# Patient Record
Sex: FEMALE | Race: WHITE | NOT HISPANIC OR LATINO | Employment: UNEMPLOYED | URBAN - METROPOLITAN AREA
[De-identification: names, ages, dates, MRNs, and addresses within clinical notes are randomized per-mention and may not be internally consistent; named-entity substitution may affect disease eponyms.]

---

## 2017-03-06 ENCOUNTER — RECORDS - HEALTHEAST (OUTPATIENT)
Dept: GENERAL RADIOLOGY | Facility: CLINIC | Age: 42
End: 2017-03-06

## 2017-03-06 ENCOUNTER — OFFICE VISIT - HEALTHEAST (OUTPATIENT)
Dept: PODIATRY | Facility: CLINIC | Age: 42
End: 2017-03-06

## 2017-03-06 ENCOUNTER — RECORDS - HEALTHEAST (OUTPATIENT)
Dept: ADMINISTRATIVE | Facility: OTHER | Age: 42
End: 2017-03-06

## 2017-03-06 DIAGNOSIS — M20.10 HALLUX VALGUS (ACQUIRED), UNSPECIFIED FOOT: ICD-10-CM

## 2017-03-06 DIAGNOSIS — M20.10 HALLUX VALGUS, UNSPECIFIED LATERALITY: ICD-10-CM

## 2017-03-20 ENCOUNTER — COMMUNICATION - HEALTHEAST (OUTPATIENT)
Dept: TELEHEALTH | Facility: CLINIC | Age: 42
End: 2017-03-20

## 2017-03-20 ENCOUNTER — OFFICE VISIT - HEALTHEAST (OUTPATIENT)
Dept: FAMILY MEDICINE | Facility: CLINIC | Age: 42
End: 2017-03-20

## 2017-03-20 DIAGNOSIS — Z72.0 TOBACCO USE: ICD-10-CM

## 2017-03-20 DIAGNOSIS — Z01.818 PRE-OP EVALUATION: ICD-10-CM

## 2017-03-20 DIAGNOSIS — M21.612 BUNION, LEFT: ICD-10-CM

## 2017-03-20 ASSESSMENT — MIFFLIN-ST. JEOR: SCORE: 1180.45

## 2017-03-23 ENCOUNTER — ANESTHESIA - HEALTHEAST (OUTPATIENT)
Dept: SURGERY | Facility: CLINIC | Age: 42
End: 2017-03-23

## 2017-03-23 ENCOUNTER — COMMUNICATION - HEALTHEAST (OUTPATIENT)
Dept: HEALTH INFORMATION MANAGEMENT | Facility: CLINIC | Age: 42
End: 2017-03-23

## 2017-03-24 ENCOUNTER — SURGERY - HEALTHEAST (OUTPATIENT)
Dept: SURGERY | Facility: CLINIC | Age: 42
End: 2017-03-24

## 2017-03-24 ASSESSMENT — MIFFLIN-ST. JEOR: SCORE: 1154.37

## 2017-03-25 ENCOUNTER — COMMUNICATION - HEALTHEAST (OUTPATIENT)
Dept: SCHEDULING | Facility: CLINIC | Age: 42
End: 2017-03-25

## 2017-03-28 ENCOUNTER — COMMUNICATION - HEALTHEAST (OUTPATIENT)
Dept: HEALTH INFORMATION MANAGEMENT | Facility: CLINIC | Age: 42
End: 2017-03-28

## 2017-03-30 ENCOUNTER — OFFICE VISIT - HEALTHEAST (OUTPATIENT)
Dept: PODIATRY | Facility: CLINIC | Age: 42
End: 2017-03-30

## 2017-03-30 DIAGNOSIS — M20.10 HALLUX VALGUS, UNSPECIFIED LATERALITY: ICD-10-CM

## 2017-04-06 ENCOUNTER — RECORDS - HEALTHEAST (OUTPATIENT)
Dept: GENERAL RADIOLOGY | Facility: CLINIC | Age: 42
End: 2017-04-06

## 2017-04-06 ENCOUNTER — OFFICE VISIT - HEALTHEAST (OUTPATIENT)
Dept: PODIATRY | Facility: CLINIC | Age: 42
End: 2017-04-06

## 2017-04-06 DIAGNOSIS — M20.10 HALLUX VALGUS, UNSPECIFIED LATERALITY: ICD-10-CM

## 2017-04-06 DIAGNOSIS — M20.10 HALLUX VALGUS (ACQUIRED), UNSPECIFIED FOOT: ICD-10-CM

## 2017-04-20 ENCOUNTER — OFFICE VISIT - HEALTHEAST (OUTPATIENT)
Dept: PODIATRY | Facility: CLINIC | Age: 42
End: 2017-04-20

## 2017-04-20 DIAGNOSIS — M20.10 HALLUX VALGUS, UNSPECIFIED LATERALITY: ICD-10-CM

## 2017-05-03 ENCOUNTER — COMMUNICATION - HEALTHEAST (OUTPATIENT)
Dept: ADMINISTRATIVE | Facility: CLINIC | Age: 42
End: 2017-05-03

## 2017-05-16 ENCOUNTER — COMMUNICATION - HEALTHEAST (OUTPATIENT)
Dept: SCHEDULING | Facility: CLINIC | Age: 42
End: 2017-05-16

## 2017-05-16 ENCOUNTER — COMMUNICATION - HEALTHEAST (OUTPATIENT)
Dept: ADMINISTRATIVE | Facility: CLINIC | Age: 42
End: 2017-05-16

## 2017-05-23 ENCOUNTER — OFFICE VISIT - HEALTHEAST (OUTPATIENT)
Dept: PODIATRY | Age: 42
End: 2017-05-23

## 2017-05-23 DIAGNOSIS — M79.672 LEFT FOOT PAIN: ICD-10-CM

## 2017-08-14 ENCOUNTER — RECORDS - HEALTHEAST (OUTPATIENT)
Dept: ADMINISTRATIVE | Facility: OTHER | Age: 42
End: 2017-08-14

## 2017-09-25 ENCOUNTER — COMMUNICATION - HEALTHEAST (OUTPATIENT)
Dept: HEALTH INFORMATION MANAGEMENT | Facility: CLINIC | Age: 42
End: 2017-09-25

## 2018-04-05 ENCOUNTER — RECORDS - HEALTHEAST (OUTPATIENT)
Dept: ADMINISTRATIVE | Facility: OTHER | Age: 43
End: 2018-04-05

## 2018-04-06 ENCOUNTER — RECORDS - HEALTHEAST (OUTPATIENT)
Dept: ADMINISTRATIVE | Facility: OTHER | Age: 43
End: 2018-04-06

## 2018-04-07 ENCOUNTER — RECORDS - HEALTHEAST (OUTPATIENT)
Dept: ADMINISTRATIVE | Facility: OTHER | Age: 43
End: 2018-04-07

## 2018-06-06 ENCOUNTER — OFFICE VISIT - HEALTHEAST (OUTPATIENT)
Dept: FAMILY MEDICINE | Facility: CLINIC | Age: 43
End: 2018-06-06

## 2018-06-06 DIAGNOSIS — S69.92XA HAND INJURY, LEFT, INITIAL ENCOUNTER: ICD-10-CM

## 2018-06-06 ASSESSMENT — MIFFLIN-ST. JEOR: SCORE: 1149.55

## 2018-08-16 ENCOUNTER — COMMUNICATION - HEALTHEAST (OUTPATIENT)
Dept: TELEHEALTH | Facility: CLINIC | Age: 43
End: 2018-08-16

## 2018-08-21 ENCOUNTER — COMMUNICATION - HEALTHEAST (OUTPATIENT)
Dept: HEALTH INFORMATION MANAGEMENT | Facility: CLINIC | Age: 43
End: 2018-08-21

## 2019-08-26 ENCOUNTER — OFFICE VISIT - HEALTHEAST (OUTPATIENT)
Dept: PODIATRY | Facility: CLINIC | Age: 44
End: 2019-08-26

## 2019-08-26 DIAGNOSIS — S92.401A CLOSED DISPLACED FRACTURE OF PHALANX OF RIGHT GREAT TOE, UNSPECIFIED PHALANX, INITIAL ENCOUNTER: ICD-10-CM

## 2019-08-26 ASSESSMENT — MIFFLIN-ST. JEOR: SCORE: 1147.56

## 2021-01-09 ENCOUNTER — RECORDS - HEALTHEAST (OUTPATIENT)
Dept: ADMINISTRATIVE | Facility: OTHER | Age: 46
End: 2021-01-09

## 2021-01-18 ENCOUNTER — OFFICE VISIT - HEALTHEAST (OUTPATIENT)
Dept: FAMILY MEDICINE | Facility: CLINIC | Age: 46
End: 2021-01-18

## 2021-01-18 DIAGNOSIS — M79.645 PAIN OF FINGER OF LEFT HAND: ICD-10-CM

## 2021-01-18 DIAGNOSIS — Z23 NEED FOR IMMUNIZATION AGAINST INFLUENZA: ICD-10-CM

## 2021-01-18 DIAGNOSIS — Z82.0 FAMILY HISTORY OF EPILEPSY IN FATHER: ICD-10-CM

## 2021-01-18 DIAGNOSIS — Z09 HOSPITAL DISCHARGE FOLLOW-UP: ICD-10-CM

## 2021-01-18 DIAGNOSIS — Z23 NEED FOR VACCINATION: ICD-10-CM

## 2021-01-18 DIAGNOSIS — R47.81 SLURRED SPEECH: ICD-10-CM

## 2021-01-18 ASSESSMENT — MIFFLIN-ST. JEOR: SCORE: 1132.18

## 2021-02-04 ENCOUNTER — COMMUNICATION - HEALTHEAST (OUTPATIENT)
Dept: FAMILY MEDICINE | Facility: CLINIC | Age: 46
End: 2021-02-04

## 2021-02-17 ASSESSMENT — PATIENT HEALTH QUESTIONNAIRE - PHQ9: SUM OF ALL RESPONSES TO PHQ QUESTIONS 1-9: 12

## 2021-02-18 ENCOUNTER — OFFICE VISIT - HEALTHEAST (OUTPATIENT)
Dept: FAMILY MEDICINE | Facility: CLINIC | Age: 46
End: 2021-02-18

## 2021-02-18 DIAGNOSIS — Z80.3 FAMILY HISTORY OF MALIGNANT NEOPLASM OF BREAST: ICD-10-CM

## 2021-02-18 DIAGNOSIS — N91.2 AMENORRHEA: ICD-10-CM

## 2021-02-18 DIAGNOSIS — K64.4 EXTERNAL HEMORRHOIDS: ICD-10-CM

## 2021-02-18 DIAGNOSIS — N89.8 VAGINAL DISCHARGE: ICD-10-CM

## 2021-02-18 DIAGNOSIS — A59.01 TRICHOMONAL VAGINITIS: ICD-10-CM

## 2021-02-18 DIAGNOSIS — Z00.00 ANNUAL PHYSICAL EXAM: ICD-10-CM

## 2021-02-18 DIAGNOSIS — B37.31 CANDIDIASIS OF VAGINA: ICD-10-CM

## 2021-02-18 DIAGNOSIS — Z84.89 FAMILY HISTORY OF SEIZURES: ICD-10-CM

## 2021-02-18 DIAGNOSIS — Z86.2 HISTORY OF ANEMIA: ICD-10-CM

## 2021-02-18 DIAGNOSIS — Z01.419 ENCOUNTER FOR ROUTINE GYNECOLOGICAL EXAMINATION: ICD-10-CM

## 2021-02-18 DIAGNOSIS — K25.4 GASTROINTESTINAL HEMORRHAGE ASSOCIATED WITH GASTRIC ULCER: ICD-10-CM

## 2021-02-18 DIAGNOSIS — Z12.31 ENCOUNTER FOR SCREENING MAMMOGRAM FOR BREAST CANCER: ICD-10-CM

## 2021-02-18 LAB
ALBUMIN SERPL-MCNC: 3.9 G/DL (ref 3.5–5)
ALP SERPL-CCNC: 59 U/L (ref 45–120)
ALT SERPL W P-5'-P-CCNC: 13 U/L (ref 0–45)
ANION GAP SERPL CALCULATED.3IONS-SCNC: 7 MMOL/L (ref 5–18)
AST SERPL W P-5'-P-CCNC: 15 U/L (ref 0–40)
BASOPHILS # BLD AUTO: 0 THOU/UL (ref 0–0.2)
BASOPHILS NFR BLD AUTO: 1 % (ref 0–2)
BILIRUB SERPL-MCNC: 0.3 MG/DL (ref 0–1)
BUN SERPL-MCNC: 25 MG/DL (ref 8–22)
CALCIUM SERPL-MCNC: 9.2 MG/DL (ref 8.5–10.5)
CHLORIDE BLD-SCNC: 104 MMOL/L (ref 98–107)
CLUE CELLS: ABNORMAL
CO2 SERPL-SCNC: 28 MMOL/L (ref 22–31)
CREAT SERPL-MCNC: 0.75 MG/DL (ref 0.6–1.1)
EOSINOPHIL # BLD AUTO: 0.2 THOU/UL (ref 0–0.4)
EOSINOPHIL NFR BLD AUTO: 2 % (ref 0–6)
ERYTHROCYTE [DISTWIDTH] IN BLOOD BY AUTOMATED COUNT: 13.2 % (ref 11–14.5)
GFR SERPL CREATININE-BSD FRML MDRD: >60 ML/MIN/1.73M2
GLUCOSE BLD-MCNC: 92 MG/DL (ref 70–125)
HCT VFR BLD AUTO: 39.3 % (ref 35–47)
HGB BLD-MCNC: 12.9 G/DL (ref 12–16)
IMM GRANULOCYTES # BLD: 0 THOU/UL
IMM GRANULOCYTES NFR BLD: 0 %
LDLC SERPL CALC-MCNC: 112 MG/DL
LYMPHOCYTES # BLD AUTO: 2.6 THOU/UL (ref 0.8–4.4)
LYMPHOCYTES NFR BLD AUTO: 33 % (ref 20–40)
MCH RBC QN AUTO: 30.7 PG (ref 27–34)
MCHC RBC AUTO-ENTMCNC: 32.8 G/DL (ref 32–36)
MCV RBC AUTO: 94 FL (ref 80–100)
MONOCYTES # BLD AUTO: 0.7 THOU/UL (ref 0–0.9)
MONOCYTES NFR BLD AUTO: 9 % (ref 2–10)
NEUTROPHILS # BLD AUTO: 4.2 THOU/UL (ref 2–7.7)
NEUTROPHILS NFR BLD AUTO: 55 % (ref 50–70)
PLATELET # BLD AUTO: 291 THOU/UL (ref 140–440)
PMV BLD AUTO: 10.1 FL (ref 7–10)
POTASSIUM BLD-SCNC: 4.1 MMOL/L (ref 3.5–5)
PROT SERPL-MCNC: 7 G/DL (ref 6–8)
RBC # BLD AUTO: 4.2 MILL/UL (ref 3.8–5.4)
SODIUM SERPL-SCNC: 139 MMOL/L (ref 136–145)
TRICHOMONAS, WET PREP: ABNORMAL
TSH SERPL DL<=0.005 MIU/L-ACNC: 1.56 UIU/ML (ref 0.3–5)
WBC: 7.7 THOU/UL (ref 4–11)
YEAST, WET PREP: ABNORMAL

## 2021-02-18 ASSESSMENT — MIFFLIN-ST. JEOR: SCORE: 1138.97

## 2021-02-19 ENCOUNTER — COMMUNICATION - HEALTHEAST (OUTPATIENT)
Dept: FAMILY MEDICINE | Facility: CLINIC | Age: 46
End: 2021-02-19

## 2021-02-19 LAB
C TRACH DNA SPEC QL PROBE+SIG AMP: NEGATIVE
HPV SOURCE: ABNORMAL
HUMAN PAPILLOMA VIRUS 16 DNA: NEGATIVE
HUMAN PAPILLOMA VIRUS 18 DNA: NEGATIVE
HUMAN PAPILLOMA VIRUS FINAL DIAGNOSIS: ABNORMAL
HUMAN PAPILLOMA VIRUS OTHER HR: POSITIVE
N GONORRHOEA DNA SPEC QL NAA+PROBE: NEGATIVE
SPECIMEN DESCRIPTION: ABNORMAL

## 2021-02-23 ENCOUNTER — COMMUNICATION - HEALTHEAST (OUTPATIENT)
Dept: FAMILY MEDICINE | Facility: CLINIC | Age: 46
End: 2021-02-23

## 2021-02-25 ENCOUNTER — COMMUNICATION - HEALTHEAST (OUTPATIENT)
Dept: OBGYN | Facility: CLINIC | Age: 46
End: 2021-02-25

## 2021-02-25 DIAGNOSIS — R87.611 ATYPICAL SQUAMOUS CELLS CANNOT EXCLUDE HIGH GRADE SQUAMOUS INTRAEPITHELIAL LESION ON CYTOLOGIC SMEAR OF CERVIX (ASC-H): ICD-10-CM

## 2021-02-25 LAB
BKR LAB AP ABNORMAL BLEEDING: YES
BKR LAB AP BIRTH CONTROL/HORMONES: ABNORMAL
BKR LAB AP CERVICAL APPEARANCE: NORMAL
BKR LAB AP GYN ADEQUACY: ABNORMAL
BKR LAB AP GYN INTERPRETATION: ABNORMAL
BKR LAB AP HPV REFLEX: ABNORMAL
BKR LAB AP LMP: ABNORMAL
BKR LAB AP PATIENT STATUS: ABNORMAL
BKR LAB AP PREVIOUS ABNORMAL: ABNORMAL
BKR LAB AP PREVIOUS NORMAL: ABNORMAL
HIGH RISK?: NO
PATH REPORT.COMMENTS IMP SPEC: ABNORMAL
RESULT FLAG (HE HISTORICAL CONVERSION): ABNORMAL

## 2021-04-19 ENCOUNTER — COMMUNICATION - HEALTHEAST (OUTPATIENT)
Dept: OBGYN | Facility: CLINIC | Age: 46
End: 2021-04-19

## 2021-04-19 ENCOUNTER — COMMUNICATION - HEALTHEAST (OUTPATIENT)
Dept: FAMILY MEDICINE | Facility: CLINIC | Age: 46
End: 2021-04-19

## 2021-05-18 ENCOUNTER — COMMUNICATION - HEALTHEAST (OUTPATIENT)
Dept: OBGYN | Facility: CLINIC | Age: 46
End: 2021-05-18

## 2021-05-24 ENCOUNTER — RECORDS - HEALTHEAST (OUTPATIENT)
Dept: ADMINISTRATIVE | Facility: CLINIC | Age: 46
End: 2021-05-24

## 2021-05-27 ASSESSMENT — PATIENT HEALTH QUESTIONNAIRE - PHQ9: SUM OF ALL RESPONSES TO PHQ QUESTIONS 1-9: 12

## 2021-05-29 ENCOUNTER — RECORDS - HEALTHEAST (OUTPATIENT)
Dept: ADMINISTRATIVE | Facility: CLINIC | Age: 46
End: 2021-05-29

## 2021-05-30 ENCOUNTER — RECORDS - HEALTHEAST (OUTPATIENT)
Dept: ADMINISTRATIVE | Facility: CLINIC | Age: 46
End: 2021-05-30

## 2021-05-30 VITALS — BODY MASS INDEX: 19.55 KG/M2 | WEIGHT: 114.5 LBS | HEIGHT: 64 IN

## 2021-05-30 VITALS — BODY MASS INDEX: 19.74 KG/M2 | WEIGHT: 118.5 LBS | HEIGHT: 65 IN

## 2021-05-31 NOTE — PROGRESS NOTES
FOOT AND ANKLE SURGERY/PODIATRY Progress Note        ASSESSMENT:   Nondisplaced fracture right great toe    HPI: Arely Nuñez was seen again today complaining of painful right great toe.  The patient stated that she injured her right great toe 3 4 days ago.  She dropped a large salt block on her right great toe.  She stated that at the time of the injury she has severe pain, discoloration of the right great toe.  She had an x-ray taken which showed a nondisplaced comminuted fracture of the distal shaft of the distal phalanx of the right great toe.  The patient is currently ambulating in a cam boot.    Past Medical History:   Diagnosis Date     Anxiety      Bacterial vaginosis      Constipation      Depression      Depression     Created by Conversion      Hearing loss in right ear        Past Surgical History:   Procedure Laterality Date     MASTOID DEBRIDEMENT Right 1990     KS CORRJ HALLUX VALGUS W/SESMDC W/1METAR MEDIAL CNF Left 3/24/2017    Procedure: LAPIDUS BUNIONECTOMY LEFT FOOT;  Surgeon: Jah Welch DPM;  Location: [facility];  Service: Podiatry     KS LIGATE FALLOPIAN TUBE,POSTPARTUM      Description: Tubal Ligation After Vaginal Delivery (Same Hospitalization);  Recorded: 03/05/2011;  Comments: 1/8/11     SKIN GRAFT SPLIT THICKNESS LEG / FOOT  2014       Allergies   Allergen Reactions     Bacitracin Hives         Current Outpatient Medications:      HYDROcodone-acetaminophen 5-325 mg per tablet, , Disp: , Rfl: 0    Family History   Problem Relation Age of Onset     COPD Mother      Stroke Father      Heart disease Father        Social History     Socioeconomic History     Marital status: Single     Spouse name: Not on file     Number of children: Not on file     Years of education: Not on file     Highest education level: Not on file   Occupational History     Not on file   Social Needs     Financial resource strain: Not on file     Food insecurity:     Worry: Not on file      Inability: Not on file     Transportation needs:     Medical: Not on file     Non-medical: Not on file   Tobacco Use     Smoking status: Current Every Day Smoker     Packs/day: 0.50     Years: 10.00     Pack years: 5.00     Types: Cigarettes     Smokeless tobacco: Never Used   Substance and Sexual Activity     Alcohol use: Yes     Comment: one drink per month     Drug use: No     Sexual activity: Yes     Partners: Male     Birth control/protection: Surgical   Lifestyle     Physical activity:     Days per week: Not on file     Minutes per session: Not on file     Stress: Not on file   Relationships     Social connections:     Talks on phone: Not on file     Gets together: Not on file     Attends Denominational service: Not on file     Active member of club or organization: Not on file     Attends meetings of clubs or organizations: Not on file     Relationship status: Not on file     Intimate partner violence:     Fear of current or ex partner: Not on file     Emotionally abused: Not on file     Physically abused: Not on file     Forced sexual activity: Not on file   Other Topics Concern     Not on file   Social History Narrative     Not on file       10 point Review of Systems is negative     Vitals:    08/26/19 1614   BP: 118/78   Pulse: 80   Resp: 18   Temp: 98  F (36.7  C)       BMI= Body mass index is 19.4 kg/m .    OBJECTIVE:  General appearance: Patient is alert and fully cooperative with history & exam.  No sign of distress is noted during the visit.  Vascular: Dorsalis pedis and posterior tibial pulses are palpable. There is pedal hair growth bilaterally.  CFT < 3 sec from anterior tibial surface to distal digits bilaterally. There is no appreciable edema noted.  Dermatologic: Turgor and texture are within normal limits. No coloration or temperature changes. No primary or secondary lesions noted.  Neurologic: All epicritic and proprioceptive sensations are grossly intact bilaterally.  Musculoskeletal: All active  and passive ankle, subtalar, midtarsal, and 1st MPJ range of motion are grossly intact without pain or crepitus, with the exception of right great toe. Manual muscle strength is within normal limits bilaterally. All dorsiflexors, plantarflexors, invertors, evertors are intact bilaterally. Tenderness present to right great toe on palpation. Tenderness to right great toe with range of motion. Calf is soft/non-tender without warmth/induration    Imaging:     No results found.         TREATMENT:  I recommended the patient continue to wear the cam boot for the next 6 weeks.  She is to wear the cam boot until October 2 of this year.  She may then attempt to wear normal shoes.  She continues to have severe pain she is to return to the clinic for follow-up x-ray.        Jah Welch; SJ  [facility] Foot & Ankle Surgery/Podiatry

## 2021-06-01 ENCOUNTER — RECORDS - HEALTHEAST (OUTPATIENT)
Dept: ADMINISTRATIVE | Facility: CLINIC | Age: 46
End: 2021-06-01

## 2021-06-01 VITALS — BODY MASS INDEX: 19.37 KG/M2 | HEIGHT: 64 IN | WEIGHT: 113.44 LBS

## 2021-06-02 ENCOUNTER — RECORDS - HEALTHEAST (OUTPATIENT)
Dept: ADMINISTRATIVE | Facility: CLINIC | Age: 46
End: 2021-06-02

## 2021-06-03 VITALS — WEIGHT: 113 LBS | HEIGHT: 64 IN | BODY MASS INDEX: 19.29 KG/M2

## 2021-06-05 VITALS
BODY MASS INDEX: 18.86 KG/M2 | RESPIRATION RATE: 14 BRPM | HEIGHT: 64 IN | TEMPERATURE: 97.6 F | OXYGEN SATURATION: 98 % | SYSTOLIC BLOOD PRESSURE: 124 MMHG | WEIGHT: 110.5 LBS | HEART RATE: 82 BPM | DIASTOLIC BLOOD PRESSURE: 76 MMHG

## 2021-06-05 VITALS
SYSTOLIC BLOOD PRESSURE: 124 MMHG | WEIGHT: 107.9 LBS | DIASTOLIC BLOOD PRESSURE: 84 MMHG | HEIGHT: 64 IN | OXYGEN SATURATION: 99 % | TEMPERATURE: 98.3 F | HEART RATE: 75 BPM | BODY MASS INDEX: 18.42 KG/M2 | RESPIRATION RATE: 14 BRPM

## 2021-06-09 NOTE — PROGRESS NOTES
Subjective findings: The patient returned to the clinic today for postop visit #2, 2 weeks status post Lapidus bunionectomy left foot. The patient is in good spirits and she had no complaints.     Objective findings: The dressings were removed and the wound margin is well healed.  There is minimal edema noted. There is no erythema or cellulitis noted. Neurovascular status is intact.  Surgical correction is maintained.     Assessment: Hallux abductovalgus left foot     Plan: Skin staples were removed today.  A postop x-ray of the left foot was also taken today.  Applied a sterile dressing to the left foot today. The patient is to return to the clinic in 1 month for postop visit #3 at which time a postop x-ray will be taken.

## 2021-06-09 NOTE — PROGRESS NOTES
Admission History & Physical  Arely Nuñez, 1975, [id number]    [facility]  Juana Asha Rivera MD, [phone]    Extended Emergency Contact Information  Primary Emergency Contact: Sacha Meng   [place]  Mobile Phone: [phone]  Relation: Sibling  Secondary Emergency Contact: TaqueriaIrma randall   [facility]  Mobile Phone: [phone]  Relation: Sister-In-Law     Assessment and Plan:   Assessment: Hallux abductovalgus both feet  Plan: The patient has been scheduled for surgical correction of her painful bunion deformities.  Active Problems:    * No active hospital problems. *      Chief Complaint:  painful bunions both feet      HPI:    Arely Nuñez is a 41 y.o. old female who presented to the clinic today complaining of painful bunions both feet.  She stated that the pain has been progressing over the past several years.  She finds it difficult to weight-bear and ambulate in normal shoes.  The pain is moderate to severe.  The pain is only relieved with nonweightbearing.  It is an aching type pain.  She has not had any associated with swelling.  She denies any other previous treatment.  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   History is provided by patient    Medical History  Active Ambulatory (Non-Hospital) Problems    Diagnosis     Anxiety     Depression     Bacterial Vaginosis     Vaginal Discharge     Hearing Loss     Constipation     Past Medical History:   Diagnosis Date     Anxiety      Bacterial vaginosis      Constipation      Depression      Hearing loss in right ear      Patient Active Problem List    Diagnosis Date Noted     Anxiety      Depression      Bacterial Vaginosis      Vaginal Discharge      Hearing Loss      Constipation      Surgical History  She  has a past surgical history that includes ligate fallopian tube,postpartum and Tubal ligation.   Past Surgical  History:   Procedure Laterality Date     [place] LIGATE FALLOPIAN TUBE,POSTPARTUM      Description: Tubal Ligation After Vaginal Delivery (Same Hospitalization);  Recorded: 03/05/2011;  Comments: 1/8/11     TUBAL LIGATION      Social History  Reviewed, and she  reports that she has been smoking Cigarettes.  She does not have any smokeless tobacco history on file.  Social History   Substance Use Topics     Smoking status: Current Every Day Smoker     Types: Cigarettes     Smokeless tobacco: Not on file     Alcohol use Not on file      Allergies  Allergies   Allergen Reactions     Baciguent Hives     Bacitracin Hives    Family History  Reviewed, and family history is not on file.   Psychosocial Needs  Social History     Social History Narrative     Additional psychosocial needs reviewed per nursing assessment.       Prior to Admission Medications     (Not in a hospital admission)        Review of Systems - Negative      Visit Vitals     BP (!) 80/62     Pulse 99     LMP 02/01/2017     SpO2 99%     Objective findings: Gen.: The patient is alert and in no acute distress      Integument: Nails bilateral feet normal length and color. Skin bilateral feet warm supple and intact.      Vascular: DP and PT pulses palpable bilaterally. Capillary refill less than 2 seconds bilateral feet.      Neurologic: Negative clonus, negative Babinski bilateral feet.       Musculoskeletal: Range of motion within normal limits bilateral feet. Muscle power +5 over 5 bilaterally in all compartments. There is a large firm subcutaneous mass on the medial aspect of the head of the first metatarsal both feet.  There is lateral deviation of the hallux with buttress of the second toe bilateral feet.  There is good range of motion at the first MPJ both feet.  No crepitus on range of motion of the first MPJ bilateral feet.  This no edema or erythema surrounding the first MPJ both feet.         Assessment: Hallux abductovalgus both feet     Plan: X-rays of  both feet were taken today.  I informed the patient she would require surgical correction of her painful bunion deformities.  I have recommended a Lapidus bunionectomy of the left foot and a bunionectomy with first metatarsal osteotomy with internal screw fixation of the right foot.  The patient was told that all the procedures are performed on an outpatient basis under local anesthesia with IV sedation.  She was asked to go nothing by mouth at midnight prior to the procedure.  She will asked to see her primary care physician for preoperative consultation.  She was given a written list of potential postoperative complications with the procedures.

## 2021-06-09 NOTE — PROGRESS NOTES
PRE OPERATIVE EVALUATION    Surgery: #2  LAPIDUS BUNIONECTOMY LEFT FOOT  Planned anesthesia:    General  Surgeon  Dr. Welch  Location:  [facility]  Date and time:   3.24.17    SUBJECTIVE:  Arely Nuñez is a 41 y.o. female who presents to the office today for a preoperative consultation. Has been having pain in Left 1st MTP joint for a few years.  Scheduled for a bunionectomy.      Prior Anesthesia: yes  Prior Anesthetic Reaction: No   Patients bleeding risk: No  Family History Anesthetic Reaction: No        Pertinent History    Diabetes: no  Cardiovascular Disease: no  Pulmonary Disease: no  Renal Disease: no  GI Disease: no  Sleep Apnea: no  Thromboembolic Problems: no  Clotting Disorder: no  Bleeding Disorder: no  Transfusion Reaction: n/a  Impaired Immunity: no  Steroid use in the last 6 months: no  Frequent Aspirin or NSAID use: no  Family History Bleeding or Clotting Disorder: no    Social history of there are no concerns regarding care after surgery        PROBLEM LIST:  Patient Active Problem List   Diagnosis     Anxiety     Hearing Loss       MEDICATIONS:  No current outpatient prescriptions on file prior to visit.     No current facility-administered medications on file prior to visit.          ALLERGIES:  Allergies   Allergen Reactions     Bacitracin Hives       PAST MEDICAL HISTORY:  Past Medical History:   Diagnosis Date     Anxiety      Bacterial vaginosis      Constipation      Depression      Depression     Created by Conversion      Hearing loss in right ear          PAST SURGICAL HISTORY:  Past Surgical History:   Procedure Laterality Date     MASTOID DEBRIDEMENT Right 1990     [place] LIGATE FALLOPIAN TUBE,POSTPARTUM      Description: Tubal Ligation After Vaginal Delivery (Same Hospitalization);  Recorded: 03/05/2011;  Comments: 1/8/11     SKIN GRAFT SPLIT THICKNESS LEG / FOOT  2014     TUBAL LIGATION             SOCIAL HISTORY:  Social History     Occupational History     Not on file.      Social History Main Topics     Smoking status: Current Every Day Smoker     Packs/day: 0.50     Years: 10.00     Types: Cigarettes     Smokeless tobacco: Not on file     Alcohol use Yes      Comment: one drink per month     Drug use: Not on file     Sexual activity: Yes     Partners: Male     Birth control/ protection: Surgical               FAMILY HISTORY:  Family History   Problem Relation Age of Onset     COPD Mother      Stroke Father      Heart disease Father            REVIEW OF SYSTEMS  GENERAL: Fever: no  Chills  no   Fatigue no  HEENT: Cold symptoms:no  RESPIRATORY:  Cough: no       Dyspnea: no  CARDIOVASCULAR: Chest Pain: no  Palpitations: no  GI: Vomiting: no   Diarrhea: no   : Dysuria: no  Frequency no  NEURO: Dysphasia: no   Motor Weakness: no   Numbness: no  SKIN: Rash: no  HEME:  Bleeding/Bruising Issues: no  MS:  Lower Extremity Swelling no    Exercise Capacity: normal          OBJECTIVE:  Vitals:    03/20/17 1331   BP: 92/66   Pulse: 90   Resp: 16   Temp: 98.3  F (36.8  C)       GEN:  NAD, cooperative  MS:  A&O, affect normal, speech normal  HEENT:  Conjunctiva clear.  Sclera anicteric.  Nares clear.  Oropharynx clear without erythema or exudate.  TMs and EACs normal.  NECK:  supple, no lymphadenopathy or thyromegaly  CV:  S1S2 RRR, no M/R/G  RESP:  CTA bilaterally  ABD: +BS, soft, nontender, nondistended, No organomegaly   EXT:  Warm and dry, no edema   NEUROLOGIC:  PERRLA.  A & O x 3.  No tremor, no focal findings.  Normal gait.    SKIN:  No rashes or lesions.        ASSESSMENT:      41 y.o. female with planned surgery as above.   1. Pre-op evaluation    2. Bunion, left    3. Tobacco use       Known risk factors for perioperative complications: Tobacco use    No contraindication for planned procedure.      PLAN:  1. Preoperative workup as follows:  No orders of the defined types were placed in this encounter.          2. Change in medication regimen before surgery: none    I have counseled  the patient for tobacco cessation and the follow up will occur  at the next visit.  SHe will start wellbutrin.  Has had success with that in the past.  Advised as little smoking as possible before surgery    Patient has had had tubal ligation        Discussed NPO night prior and no NSAIDS for 7 days prior.     Patient approved for surgery with general or local anesthesia. Postoperative pain to be managed by surgeon during post-operative Global Surgical Package timeframe, typically 30-60 days for major surgery, and less for others. Above recommendations were reviewed with the patient. Copy of the pre-op was given to the patient to bring along on the day of surgery.        Alli Kline MD    3/20/2017        Alli Kline MD    3/20/2017

## 2021-06-09 NOTE — ANESTHESIA POSTPROCEDURE EVALUATION
Patient: Arley Nuñez  LAPIDUS BUNIONECTOMY LEFT FOOT  Anesthesia type: MAC    Patient location: PACU  Last vitals:   Vitals:    03/24/17 0958   BP: 121/60   Pulse: 79   Resp: 18   Temp:    SpO2: 100%     Post vital signs: stable  Level of consciousness: awake and responds to simple questions  Post-anesthesia pain: pain controlled  Post-anesthesia nausea and vomiting: no  Pulmonary: unassisted, return to baseline  Cardiovascular: stable and blood pressure at baseline  Hydration: adequate  Anesthetic events: no    QCDR Measures:  ASA# 11 - Agueda-op Cardiac Arrest: ASA11B - Patient did NOT experience unanticipated cardiac arrest  ASA# 12 - Agueda-op Mortality Rate: ASA12B - Patient did NOT die  ASA# 13 - PACU Re-Intubation Rate: ASA13B - Patient did NOT require a new airway mgmt  ASA# 10 - Composite Anes Safety: ASA10A - No serious adverse event  ASA# 38 - New Corneal Injury: ASA38A - No new exposure keratitis or corneal abrasion in PACU    Additional Notes:

## 2021-06-09 NOTE — PROGRESS NOTES
Subjective findings: The patient returned to the clinic today for postop visit #1, 1 week status post Lapidus bunionectomy left foot.  The patient is in good spirits and she had no complaints.    Objective findings: The dressings were removed and the wound margin is well coaptated and maintained.  There is minimal edema noted.  There is no erythema or cellulitis noted.  Neurovascular status is intact.  Vital signs are stable.  Surgical correction is maintained.    Assessment: Hallux abductovalgus left foot    Plan: Applied a sterile dressing to the left foot today.  The patient is to return to the clinic in 1 week for postop visit #2 at which time sutures will be removed and a postop x-ray will be taken.

## 2021-06-09 NOTE — ANESTHESIA CARE TRANSFER NOTE
Last vitals:   Vitals:    03/24/17 0843   BP: 102/56   Pulse: 85   Resp: 12   Temp: 36.2  C (97.1  F)   SpO2: 100%     Patient's level of consciousness is drowsy  Spontaneous respirations: yes  Maintains airway independently: yes  Dentition unchanged: yes  Oropharynx: oropharynx clear of all foreign objects    QCDR Measures:  ASA# 20 - Surgical Safety Checklist: ASA20A - Safety Checks Done  PQRS# 430 - Adult PONV Prevention: NA - Not adult patient, not GA or 3 or more risk factors NOT present  ASA# 8 - Peds PONV Prevention: NA - Not pediatric patient, not GA or 2 or more risk factors NOT present  PQRS# 424 - Agueda-op Temp Management: 4559F - At least one body temp DOCUMENTED => 35.5C or 95.9F within required timeframe  PQRS# 426 - PACU Transfer Protocol: - Transfer of care checklist used  ASA# 14 - Acute Post-op Pain: ASA14B - Patient did NOT experience pain >= 7 out of 10    I completed my SBAR handoff to the receiving nurse per policy and procedure.

## 2021-06-09 NOTE — ANESTHESIA PREPROCEDURE EVALUATION
Anesthesia Evaluation      Patient summary reviewed     Airway   Mallampati: II  Neck ROM: full   Pulmonary - normal exam   (+) a smoker                         Cardiovascular - negative ROS  Rhythm: regular  Rate: normal,         Neuro/Psych - negative ROS     Endo/Other - negative ROS      GI/Hepatic/Renal - negative ROS           Dental - normal exam                        Anesthesia Plan  Planned anesthetic: MAC    ASA 2     Anesthetic plan and risks discussed with: patient    Post-op plan: routine recovery

## 2021-06-10 NOTE — PROGRESS NOTES
Subjective findings: The patient returned to the clinic today for postop visit #3, 3 weeks status post Lapidus bunionectomy left foot. The patient is in good spirits and she had no complaints.      Objective findings: The dressings were removed and the wound margin is well healed.  There is minimal edema noted. There is no erythema or cellulitis noted. Neurovascular status is intact. Surgical correction is maintained.      Assessment: Hallux abductovalgus left foot      Plan:  A postop x-ray of the left foot was also taken today.  I informed the patient that she is healed without complication.  She may now begin to wear normal shoe gear.  She was told to expect some mild to moderate discomfort and some mild swelling for several weeks.  She is to return to the clinic as needed.

## 2021-06-10 NOTE — PROGRESS NOTES
Subjective findings: The patient returned to the clinic today for postop visit  status post Lapidus bunionectomy left foot. The patient stated that she fell approximately 10 days ago and since that time she has had some aching type pain involving her left foot.  She has found it difficult to wear her shoes at this time.  She has been limping because of the pain.  She stated following her fall she developed moderate to severe.  She was having no pain until the injury.  Pain.      Objective findings: The wound is healing well.  There is a small area of dehiscence.  There is no erythema or cellulitis noted.  Neurovascular status is intact.  Surgical correction is maintained.  There is pain on palpation of the dorsal aspect of the base of the first metatarsocuneiform joint.  No palpable masses noted.        Assessment: Left foot pain.        Plan:  An x-ray of the left foot was also taken today.  I informed the patient that there is no evidence of any placement of the arthrodesis site.  There is no sign of any fractures or dislocations.  I recommended the patient ambulate in the cam walker for 3-4 weeks.  She is to return to the clinic if her pain persist.

## 2021-06-14 NOTE — PROGRESS NOTES
"  Assessment & Plan     Hospital discharge follow-up  Family history of epilepsy in father  Slurred speech  ER note from [place] reviewed as well as all of the labs and imaging.  Normal neurologic exam in clinic today.  And imaging and labs all negative in the ER  Given family history of epilepsy will refer to neurology for further assessment.  - Ambulatory referral to Neurology    Pain of finger of left hand  Likely a allergic skin reaction.  Recommended that she use Benadryl, ibuprofen and topical antibiotic     Need for immunization against influenza  - Influenza, Recombinant, Inj, Quadrivalent, PF, 18+YRS    Need for vaccination  - Td, Preservative Free (green label)      Review of external notes as documented above     Follow-up in 6 months for annual physical    Juana Rivera MD  [facility]    Subjective     Arely Nuñez is 45 y.o. and presents to clinic today for the following health issues   HPI     ER Visiton 1/9/21 at [facility]  Chart reviewed:  Head CT, neck CT, head CTA, Labs all normal  Brain MRI normal  Assessment:   Acute intermittent jaw clenching, slurred speech, w/o other focal weakness. Concerning for psychogenic, complex migraine, r/o stroke. No tPA due to mild symptoms and alternative diagnosis.     Denies etoh, drugs, herbs  [facility] - dad has epilepsy and h/o stroke (dx at age 40's)     Left ring finger tender lump  Allergic to fake jewelry and wore ring for a months and when took off and got a bump and then swollen, and had pus drain when she squeezed      Review of Systems   Please see above.  The rest of the review of systems are negative for all systems.         Objective    /84   Pulse 75   Temp 98.3  F (36.8  C) (Oral)   Resp 14   Ht 5' 4.49\" (1.638 m)   Wt 107 lb 14.4 oz (48.9 kg)   LMP 07/05/2020 (Approximate)   SpO2 99%   BMI 18.24 kg/m    Body mass index is 18.24 kg/m .  Physical Exam    Vitals:    01/18/21 1046   BP: 124/84   Pulse: 75 " "  Resp: 14   Temp: 98.3  F (36.8  C)   TempSrc: Oral   SpO2: 99%   Weight: 107 lb 14.4 oz (48.9 kg)   Height: 5' 4.49\" (1.638 m)     PHYSICAL EXAM   General Appearance: Awake and alert, in no acute distress  HEENT: neck is supple  CV: regular rate  Resp: No respiratory distress. Breathing comfortably  Musculoskeletal: moving limbs comfortably with not deficits or deformities  Skin: small tender nodule in skin near MCP  joint  Neuro: normal gait, normal rhomberg, normal heel-shin and rapid alternating tests, no pronator drift,  alert and oriented, CN II-XII intact  Motor:  5/5 strength throughout in upper and lower extremities bilaterally, normal    tone, normal muscle bulk  Sensory:  Sensation intact throughout  Reflexes:  2+ in upper and lower extremities bilaterally  Cerebellum:  Normal finger to nose and rapid alternating movements, normal    Rhomberg            "

## 2021-06-15 PROBLEM — M20.12 HALLUX ABDUCTO VALGUS, LEFT: Status: ACTIVE | Noted: 2017-03-23

## 2021-06-15 NOTE — TELEPHONE ENCOUNTER
report indicate that the patient needs Physical writer intends to contact the patient to assist in scheduling appointment. Patient agree to schedule appointment with  2/18/2021.

## 2021-06-15 NOTE — TELEPHONE ENCOUNTER
----- Message from Juana Rivera MD sent at 2/18/2021  7:29 PM CST -----  Please notify patient that her vaginal discharge was positive for both yeast infection and trichomonas.  I sent a prescription for Diflucan to take 1 pill 1 time for the yeast infection.  I also sent a prescription for metronidazole to take 1 pill twice a day for a week to treat the trichomonas.    Thanks,   Dr. Juana Rivera  2/18/2021

## 2021-06-18 NOTE — PROGRESS NOTES
"ASSESMENT AND PLAN:  Diagnoses and all orders for this visit:    Hand injury, left, initial encounter  -     XR Hand Left 2 VWS  -     cyclobenzaprine (FLEXERIL) 5 MG tablet; Take 1 tablet (5 mg total) by mouth 3 (three) times a day as needed for muscle spasms.  Dispense: 30 tablet; Refill: 0    Negative X ray, no fractures/dislocation.  Will try muscle relaxant, ICE and tylenol as needed.  Provided wrist, finger support splint.   F/U in one week.  Off work this week ( 6/6, 6/7 and 6/8 ), note provided.           SUBJECTIVE: Arely Nuñez is here today with left hand injury.  The injury occurred at work this morning at around 11:45 AM.  She works at [employer] (  ). Left hand caught between sliding doors while she was cleaning.   C/O right hand pain, unable to flex her left fingers since the injury.   She is right handed.   Did not take any meds. Can not take NSAID due to stomach problem.    Past Medical History:   Diagnosis Date     Anxiety      Bacterial vaginosis      Constipation      Depression      Depression     Created by Conversion      Hearing loss in right ear      Patient Active Problem List   Diagnosis     Anxiety     Hearing Loss     Hallux abducto valgus, left       Allergies:    Allergies   Allergen Reactions     Bacitracin Hives       History   Smoking Status     Current Every Day Smoker     Packs/day: 0.50     Years: 10.00     Types: Cigarettes   Smokeless Tobacco     Never Used       Review of systems otherwise negative except as listed in HPI.   History   Smoking Status     Current Every Day Smoker     Packs/day: 0.50     Years: 10.00     Types: Cigarettes   Smokeless Tobacco     Never Used       OBJECTICE: /68 (Patient Site: Right Arm, Patient Position: Sitting, Cuff Size: Adult Regular)  Pulse 80  Temp 97.8  F (36.6  C) (Oral)   Resp 16  Ht 5' 4\" (1.626 m)  Wt 113 lb 7 oz (51.5 kg)  LMP 05/08/2018 (Within Days)  BMI 19.47 kg/m2    DATA " REVIEWED:    Radiology Tests Summarized or Ordered (1):       GEN-alert,  in no apparent distress.  HANDS- Right hand- No tenderness. Normal fingers ROM.               - Left hand- unable to flex 2nd- 5th fingers due to pain. No bruising. No joint swelling. Some tenderness at MCP joint  2nd-5th                fingers.   SKIN-no bruising. No laceration.         Vincenzo Garcia   6/6/2018

## 2021-06-21 NOTE — LETTER
Letter by No Santo RN at      Author: No Santo RN Service: -- Author Type: --    Filed:  Encounter Date: 4/19/2021 Status: (Other)         Arely Nuñez  [address]  [place] [address]             April 19, 2021         Dear Ms. Nuñez,    At [facility], your health and wellness are our primary concern. That is why we are following up on your most recent abnormal Pap smear and positive high-risk Human Papillomavirus (HPV) test.    Please call [phone] to schedule an appointment for your recommended follow-up Colposcopy (this cannot be scheduled through [facility]) at your earliest convenience.     If you have completed the appointment outside of the [facility] system, please have the records forwarded to our office. We will update your chart for your provider to review before your next annual wellness visit.     Thank you for choosing [facility]!        Sincerely,    Your [facility] Care Team

## 2021-06-21 NOTE — LETTER
Letter by Juana Rivera MD at      Author: Juana Rivera MD Service: -- Author Type: --    Filed:  Encounter Date: 2/23/2021 Status: (Other)         Arely Nuñez  [address]  [place] [address]             February 23, 2021         Dear Ms. Nuñez,    Below are the results from your recent visit:    Resulted Orders   Comprehensive Metabolic Panel   Result Value Ref Range    Sodium 139 136 - 145 mmol/L    Potassium 4.1 3.5 - 5.0 mmol/L    Chloride 104 98 - 107 mmol/L    CO2 28 22 - 31 mmol/L    Anion Gap, Calculation 7 5 - 18 mmol/L    Glucose 92 70 - 125 mg/dL    BUN 25 (H) 8 - 22 mg/dL    Creatinine 0.75 0.60 - 1.10 mg/dL    GFR MDRD Af Amer >60 >60 mL/min/1.73m2    GFR MDRD Non Af Amer >60 >60 mL/min/1.73m2    Bilirubin, Total 0.3 0.0 - 1.0 mg/dL    Calcium 9.2 8.5 - 10.5 mg/dL    Protein, Total 7.0 6.0 - 8.0 g/dL    Albumin 3.9 3.5 - 5.0 g/dL    Alkaline Phosphatase 59 45 - 120 U/L    AST 15 0 - 40 U/L    ALT 13 0 - 45 U/L    Narrative    Fasting Glucose reference range is 70-99 mg/dL per  American Diabetes Association (ADA) guidelines.   Thyroid Cascade   Result Value Ref Range    TSH 1.56 0.30 - 5.00 uIU/mL   LDL Cholesterol, Direct   Result Value Ref Range    Direct  <=129 mg/dl   Chlamydia trachomatis & Neisseria gonorrhoeae, Amplified Detection   Result Value Ref Range    Chlamydia trachomatis, Amplified Detection Negative Negative    Neisseria gonorrhoeae, Amplified Detection Negative Negative   Wet Prep, Vaginal   Result Value Ref Range    Yeast Result Yeast Seen (!) No yeast seen    Trichomonas Trichomonas seen (!) No Trichomonas seen    Clue Cells, Wet Prep No Clue cells seen No Clue cells seen   HM1 (CBC with Diff)   Result Value Ref Range    WBC 7.7 4.0 - 11.0 thou/uL    RBC 4.20 3.80 - 5.40 mill/uL    Hemoglobin 12.9 12.0 - 16.0 g/dL    Hematocrit 39.3 35.0 - 47.0 %    MCV 94 80 - 100 fL    MCH 30.7 27.0 - 34.0 pg    MCHC 32.8 32.0 - 36.0 g/dL    RDW 13.2 11.0  - 14.5 %    Platelets 291 140 - 440 thou/uL    MPV 10.1 (H) 7.0 - 10.0 fL    Neutrophils % 55 50 - 70 %    Lymphocytes % 33 20 - 40 %    Monocytes % 9 2 - 10 %    Eosinophils % 2 0 - 6 %    Basophils % 1 0 - 2 %    Immature Granulocyte % 0 <=0 %    Neutrophils Absolute 4.2 2.0 - 7.7 thou/uL    Lymphocytes Absolute 2.6 0.8 - 4.4 thou/uL    Monocytes Absolute 0.7 0.0 - 0.9 thou/uL    Eosinophils Absolute 0.2 0.0 - 0.4 thou/uL    Basophils Absolute 0.0 0.0 - 0.2 thou/uL    Immature Granulocyte Absolute 0.0 <=0.0 thou/uL       Please call with questions or contact us using [employer].    Sincerely,        Electronically signed by Juana Rivera MD

## 2021-06-30 NOTE — PROGRESS NOTES
Progress Notes by Juana Rivera MD at 2021  5:00 PM     Author: Juana Rivera MD Service: -- Author Type: Physician    Filed: 2021  7:28 PM Encounter Date: 2021 Status: Addendum    : Juana Rivera MD (Physician)    Related Notes: Original Note by Juana Rivera MD (Physician) filed at 2021  7:00 PM       FEMALE PREVENTATIVE EXAM    Assessment and Plan:       Annual physical exam  Encounter for routine gynecological examination  Pap done today  mammo ordered - especially her sister had breast cancer <51yo  Colon cancer screening - sister dx'd with colon cancer at 50 yo and  at 52yo - needs early screening for colon cancer. Colonoscopy ordered  Updated [facility] hx  - Gynecologic Cytology (PAP Smear)  - Comprehensive Metabolic Panel  - Thyroid Cascade  - 1(CBC and Differential)  - LDL Cholesterol, Direct     Encounter for screening mammogram for breast cancer  Family history of malignant neoplasm of breast  - Mammo Screening Bilateral; Future      Vaginal discharge  H/o unprotected sex and vaginal discharge for a couple of months  - Chlamydia trachomatis & Neisseria gonorrhoeae, Amplified Detection  - Wet Prep, Vaginal    Amenorrhea  H/o tubal ligation  Will check thyroid  Possibly perimenopause?  - Thyroid Cascade    History of anemia  - HM1(CBC and Differential)    Gastrointestinal hemorrhage associated with gastric ulcer  H/u upper GI bleed in 2018  Records from [facility] reviewed. She never had f/u EGD  - Ambulatory referral to Gastro[facility]ology - [facility]    Hemorrhoids  Discussed avoiding constipation with increased water, fiber, metamucil  rx for anusol    Family history of seizures  Given recent episode of slurred speech and dad's history of seizures, encouraged her to f/u with neurology as discussed at her appt last month    Next follow up:  No follow-ups on file.    Immunization Review  Adult Imm Review: No immunizations due today  She is interested in  quitting but wanted to restart welbutrin which she used years ago. However given her recent seizure, she needs to clear this with the neurologist first given welbutrin/zyban lowers the seizure threshold    I discussed the following with the patient:   Adult Healthy Living: Healthy nutrition  Getting adequate sleep  Stress management  Use of seat belts        Subjective:   Chief Complaint: Arely Nuñez is an 45 y.o. female here for a preventative health visit.    Patient has been advised of split billing requirements and indicates understanding: Yes  HPI:      PAP   8/2016 - ASCUS with neg HPV  LMP - Oct 2020  Tubal ligation  Vaginal discharge - no itchy or odor    ER visit last month for slurred speech  Given father has seizures, she was referred to neurology on 1/18/21  She has not made this appt yet    H/o bleeding ulcer 2018  Vomited blood     Healthy Habits  Are you taking a daily aspirin? No  Do you typically exercising at least 40 min, 3-4 times per week?  Yes  Do you usually eat at least 4 servings of fruit and vegetables a day, include whole grains and fiber and avoid regularly eating high fat foods? NO  Have you had an eye exam in the past two years? Yes  Do you see a dentist twice per year? Yes  Do you have any concerns regarding sleep? No    Safety Screen  If you own firearms, are they secured in a locked gun cabinet or with trigger locks? The patient does not own any firearms  No data recorded    Review of Systems:  Please see above.  The rest of the review of systems are negative for all systems.     Pap History:   No - age 30-65 PAP every 3 years recommended  Cancer Screening       Status Date      PAP SMEAR Next Due 8/30/2021      Done 8/30/2016 GYNECOLOGIC CYTOLOGY (PAP SMEAR)     Patient has more history with this topic...          Patient Care Team:  Juana Rivera MD as PCP - General  Vincenzo Garcia MD as Assigned PCP  Jah Welch DPM as Assigned Musculoskeletal  "Provider        History     Not marked as reviewed during this visit.            Objective:   Vital Signs:  Vitals:    02/18/21 1710   BP: 124/76   Pulse: 82   Resp: 14   Temp: 97.6  F (36.4  C)   TempSrc: Oral   SpO2: 98%   Weight: 110 lb 8 oz (50.1 kg)   Height: 5' 4.17\" (1.63 m)           PHYSICAL EXAM  OBJECTIVE:  Vitals listed above within normal limits  General appearance: well groomed, pleasant, well hydrated, nontoxic appearing  ENT: PERRL, throat clear  Neck: neck supple, no lymphadenopathy, no thyromegaly  Lungs: lungs clear to auscultation bilaterally, no wheezes or rhonchi  Heart: regular rate and rhythm, no murmurs, rubs or gallops  Abdomen: soft, nontender  Neuro: no focal deficits, CN II-XII grossly intact, alert and oriented  Psych:  mood stable, appears to have good insight and judgment  Pelvic exam:   External genitalia: normal  Vaginal discharge: none  Cervix: no inflammation, discharge, bleeding or lesions noted.  Bimanual exam: No cervical motion tenderness. No masses  BREAST EXAM: normal without suspicious masses, skin or nipple changes or axillary nodes, self-exam is taught and discussed             Medication List          Accurate as of February 18, 2021  7:00 PM. If you have any questions, ask your nurse or doctor.            START taking these medications    hydrocortisone 2.5 % rectal cream  Also known as: Anusol-HC  INSTRUCTIONS: Apply rectally 2 times daily  Started by: Juana Rivera MD              Where to Get Your Medications      These medications were sent to [id number] - [place], [place] - [address] AT [address] & [address]  [address], [place] [address]    Phone: [phone]     hydrocortisone 2.5 % rectal cream         Additional Screenings Completed Today:     ADDENDUM    Recent Results (from the past 1008 hour(s))   HM1 (CBC with Diff)    Collection Time: 02/18/21  6:14 PM   Result Value Ref Range    WBC 7.7 4.0 - 11.0 thou/uL    RBC 4.20 " 3.80 - 5.40 mill/uL    Hemoglobin 12.9 12.0 - 16.0 g/dL    Hematocrit 39.3 35.0 - 47.0 %    MCV 94 80 - 100 fL    MCH 30.7 27.0 - 34.0 pg    MCHC 32.8 32.0 - 36.0 g/dL    RDW 13.2 11.0 - 14.5 %    Platelets 291 140 - 440 thou/uL    MPV 10.1 (H) 7.0 - 10.0 fL    Neutrophils % 55 50 - 70 %    Lymphocytes % 33 20 - 40 %    Monocytes % 9 2 - 10 %    Eosinophils % 2 0 - 6 %    Basophils % 1 0 - 2 %    Immature Granulocyte % 0 <=0 %    Neutrophils Absolute 4.2 2.0 - 7.7 thou/uL    Lymphocytes Absolute 2.6 0.8 - 4.4 thou/uL    Monocytes Absolute 0.7 0.0 - 0.9 thou/uL    Eosinophils Absolute 0.2 0.0 - 0.4 thou/uL    Basophils Absolute 0.0 0.0 - 0.2 thou/uL    Immature Granulocyte Absolute 0.0 <=0.0 thou/uL   Wet Prep, Vaginal    Collection Time: 02/18/21  6:19 PM    Specimen: Genital   Result Value Ref Range    Yeast Result Yeast Seen (!) No yeast seen    Trichomonas Trichomonas seen (!) No Trichomonas seen    Clue Cells, Wet Prep No Clue cells seen No Clue cells seen     RX SENT FOR METRONIDAZOLE AND DIFLUCAN

## 2021-07-04 NOTE — ADDENDUM NOTE
Addendum Note by Romel Grimm MD at 2/18/2021  5:00 PM     Author: Romel Grimm MD Service: -- Author Type: Physician    Filed: 2/18/2021  7:28 PM Encounter Date: 2/18/2021 Status: Signed    : Romel Grimm MD (Physician)    Addended by: ROMEL GRIMM on: 2/18/2021 07:28 PM        Modules accepted: Orders

## 2021-07-13 ENCOUNTER — RECORDS - HEALTHEAST (OUTPATIENT)
Dept: ADMINISTRATIVE | Facility: CLINIC | Age: 46
End: 2021-07-13

## 2021-08-04 ENCOUNTER — PATIENT OUTREACH (OUTPATIENT)
Dept: FAMILY MEDICINE | Facility: CLINIC | Age: 46
End: 2021-08-04

## 2021-08-04 DIAGNOSIS — R87.611 ATYPICAL SQUAMOUS CELLS CANNOT EXCLUDE HIGH GRADE SQUAMOUS INTRAEPITHELIAL LESION ON CYTOLOGIC SMEAR OF CERVIX (ASC-H): ICD-10-CM

## 2021-08-04 NOTE — LETTER
August 4, 2021      Arely Nuñez  [address]  [place] [address]        Dear ,    At [facility], your health and wellness are our primary concern. That is why we are following up on your most recent abnormal Pap smear and positive high-risk Human Papillomavirus (HPV) test.    Please call [phone] to schedule an appointment for your recommended follow-up Colposcopy (this cannot be scheduled through [facility]) at your earliest convenience. If you have chosen not to do the recommended colposcopy, please contact your clinic to schedule an appointment for a repeat Pap smear and Human Papillomavirus (HPV) test at this time.    If you have completed the appointment outside of the [facility] system, please have the records forwarded to our office. We will update your chart for your provider to review before your next annual wellness visit.     Thank you for choosing [facility]!      Sincerely,    Your [facility] Care Team

## 2021-09-17 ENCOUNTER — TELEPHONE (OUTPATIENT)
Dept: FAMILY MEDICINE | Facility: CLINIC | Age: 46
End: 2021-09-17

## 2021-09-17 NOTE — TELEPHONE ENCOUNTER
Reason for Call:  Other order    Detailed comments: Patient stated she needs an order for an MRI on her arm.    Phone Number Patient can be reached at: Home number on file [phone] (home)    Best Time: any    Can we leave a detailed message on this number? YES    Call taken on 9/17/2021 at 5:01 PM by Nasima Peña

## 2021-09-20 NOTE — TELEPHONE ENCOUNTER
Per chart review she was seen by Ortho at [facility] in May and already had an MRI.

## 2021-10-17 ENCOUNTER — HEALTH MAINTENANCE LETTER (OUTPATIENT)
Age: 46
End: 2021-10-17

## 2021-10-18 PROBLEM — R47.1 DYSARTHRIA: Status: ACTIVE | Noted: 2021-10-18

## 2021-11-01 NOTE — TELEPHONE ENCOUNTER
FYI to provider - Patient is lost to pap tracking follow-up. Attempts to contact pt have been made per reminder process and there has been no reply and/or no appt scheduled.       2/18/21 ASCUS, +HR HPV (not 16/18). Plan: colposcopy due before 5/18/21 2/25/21 VM full  2/26/21 Pt notified   4/19/21 Reminder letter  5/18/21 Widen not done. Tracking updated for 6 mo colp/pap due 8/18/21.    8/4/21 Reminder letter  9/3/21 Reminder call -- left message  10/18/21 Widen appt -- no show  11/1/21 Lost to follow-up for pap tracking    No Santo RN BSN, Pap Tracking

## 2022-01-10 ENCOUNTER — OFFICE VISIT (OUTPATIENT)
Dept: FAMILY MEDICINE | Facility: CLINIC | Age: 47
End: 2022-01-10
Payer: COMMERCIAL

## 2022-01-10 VITALS
RESPIRATION RATE: 20 BRPM | DIASTOLIC BLOOD PRESSURE: 85 MMHG | OXYGEN SATURATION: 100 % | BODY MASS INDEX: 19.65 KG/M2 | HEIGHT: 64 IN | TEMPERATURE: 98.1 F | WEIGHT: 115.1 LBS | SYSTOLIC BLOOD PRESSURE: 120 MMHG | HEART RATE: 83 BPM

## 2022-01-10 DIAGNOSIS — Z23 NEED FOR IMMUNIZATION AGAINST INFLUENZA: ICD-10-CM

## 2022-01-10 DIAGNOSIS — R87.611 ATYPICAL SQUAMOUS CELLS CANNOT EXCLUDE HIGH GRADE SQUAMOUS INTRAEPITHELIAL LESION ON CYTOLOGIC SMEAR OF CERVIX (ASC-H): ICD-10-CM

## 2022-01-10 DIAGNOSIS — Z80.0 FAMILY HISTORY OF COLON CANCER: ICD-10-CM

## 2022-01-10 DIAGNOSIS — R87.613 HIGH GRADE SQUAMOUS INTRAEPITHELIAL CERVICAL DYSPLASIA: Primary | ICD-10-CM

## 2022-01-10 PROCEDURE — 88305 TISSUE EXAM BY PATHOLOGIST: CPT | Mod: 59 | Performed by: PATHOLOGY

## 2022-01-10 PROCEDURE — 90682 RIV4 VACC RECOMBINANT DNA IM: CPT | Performed by: FAMILY MEDICINE

## 2022-01-10 PROCEDURE — 57455 BIOPSY OF CERVIX W/SCOPE: CPT | Performed by: FAMILY MEDICINE

## 2022-01-10 PROCEDURE — 90471 IMMUNIZATION ADMIN: CPT | Performed by: FAMILY MEDICINE

## 2022-01-10 ASSESSMENT — MIFFLIN-ST. JEOR: SCORE: 1157.96

## 2022-01-10 NOTE — PROGRESS NOTES
"Arely was seen today for colposcopy.    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  Will await biopsy results before deciding next steps, will likely need treatment.   Instructed pt to not put anything in her vagina for 7 days, including having intercourse.  Reviewed signs of infection and informed her that she should call if any develop.     Atypical squamous cells cannot exclude high grade squamous intraepithelial lesion on cytologic smear of cervix (ASC-H)- on recent Pap smear.  Has known about the HPV for 11 years and put off getting her colp.   -     Surgical Pathology Exam    Family hx colon cancer- her sister  last year at age 50, was dx'ed at age 49.  Pt has been referred for colonoscopy- I urged her to call and get that scheduled.      Need for immunization against influenza  -     INFLUENZA QUAD, PF (RIV4) (FLUBLOK)        S:  Arely is here for a colposcopy.  She found out 11 years ago that she needed one for positive HPV - was pregnant at that time.  She has put off having the colposcopy done.  She has decided this year to follow up on her health issues , needs surgery on her right shoulder also.     No abnl vag discharge or bleeding.  Children aged 11-27 years.       ROS:  Neg other than HPI  PMH: meds, problem list, soc hx reviewed and updated.    O:  /85 (BP Location: Left arm, Patient Position: Sitting, Cuff Size: Adult Regular)   Pulse 83   Temp 98.1  F (36.7  C) (Oral)   Resp 20   Ht 1.635 m (5' 4.37\")   Wt 52.2 kg (115 lb 1.6 oz)   LMP 2021 (Approximate)   SpO2 100%   BMI 19.53 kg/m      Patient is in no apparent physical distress.  Head and face are normal, she is masked.   Conjunctiva are clear.  .Extremities are without edema.    Mood and affect are appropriate.    Procedure Note:  Risks and benefits of procedure reviewed with patient, consent signed.  With pt in dorsal lithotomy position, sterile " speculum inserted into vagina.  Cervix and vagina examined through colposcope, marked erythema and inflammation seen anterior and posterior cervix.   Acetic acid applied to cervix, cervix again visualized through coloscope.  Entire SCJ visualized. Increased vascularization, inflammation, and  erythema 10:00 - 2:00, prominent blood vessel 12:00.  Inflammation also 9:00-3:00.  Biopsies obtained from 12:00 and 9:00.    Monsel's solution applied, good hemostasis achieved. Pt . tolerated procedure well. No complications.

## 2022-01-12 LAB
PATH REPORT.COMMENTS IMP SPEC: NORMAL
PATH REPORT.FINAL DX SPEC: NORMAL
PATH REPORT.GROSS SPEC: NORMAL
PATH REPORT.MICROSCOPIC SPEC OTHER STN: NORMAL
PATH REPORT.RELEVANT HX SPEC: NORMAL
PHOTO IMAGE: NORMAL

## 2022-01-14 ENCOUNTER — TELEPHONE (OUTPATIENT)
Dept: FAMILY MEDICINE | Facility: CLINIC | Age: 47
End: 2022-01-14
Payer: COMMERCIAL

## 2022-01-14 DIAGNOSIS — R87.611 ATYPICAL SQUAMOUS CELLS CANNOT EXCLUDE HIGH GRADE SQUAMOUS INTRAEPITHELIAL LESION ON CYTOLOGIC SMEAR OF CERVIX (ASC-H): Primary | ICD-10-CM

## 2022-01-14 PROBLEM — N72 CHRONIC CERVICITIS: Status: ACTIVE | Noted: 2022-01-14

## 2022-01-14 NOTE — TELEPHONE ENCOUNTER
I called Arely with the results of her cervical biopsies, and explained that they showed chronic inflammation of her cervix due to the HPV virus.  According to the latest guidelines, she should be tested again in one year.  She did understand this.

## 2022-02-05 ENCOUNTER — HEALTH MAINTENANCE LETTER (OUTPATIENT)
Age: 47
End: 2022-02-05

## 2022-04-02 ENCOUNTER — HEALTH MAINTENANCE LETTER (OUTPATIENT)
Age: 47
End: 2022-04-02

## 2022-10-01 ENCOUNTER — HEALTH MAINTENANCE LETTER (OUTPATIENT)
Age: 47
End: 2022-10-01

## 2023-05-14 ENCOUNTER — HEALTH MAINTENANCE LETTER (OUTPATIENT)
Age: 48
End: 2023-05-14

## 2024-07-21 ENCOUNTER — HEALTH MAINTENANCE LETTER (OUTPATIENT)
Age: 49
End: 2024-07-21